# Patient Record
Sex: MALE | Race: BLACK OR AFRICAN AMERICAN | Employment: OTHER | URBAN - METROPOLITAN AREA
[De-identification: names, ages, dates, MRNs, and addresses within clinical notes are randomized per-mention and may not be internally consistent; named-entity substitution may affect disease eponyms.]

---

## 2022-03-01 ENCOUNTER — APPOINTMENT (OUTPATIENT)
Dept: GENERAL RADIOLOGY | Age: 57
End: 2022-03-01
Payer: MEDICAID

## 2022-03-01 ENCOUNTER — APPOINTMENT (OUTPATIENT)
Dept: CT IMAGING | Age: 57
End: 2022-03-01
Payer: MEDICAID

## 2022-03-01 ENCOUNTER — HOSPITAL ENCOUNTER (OUTPATIENT)
Age: 57
Setting detail: OBSERVATION
Discharge: NURSING, REHAB OR LONG-TERM CARE | End: 2022-03-03
Attending: EMERGENCY MEDICINE | Admitting: INTERNAL MEDICINE
Payer: MEDICAID

## 2022-03-01 DIAGNOSIS — G45.9 TIA (TRANSIENT ISCHEMIC ATTACK): Primary | ICD-10-CM

## 2022-03-01 LAB
ANION GAP SERPL CALCULATED.3IONS-SCNC: 11 MMOL/L (ref 3–16)
BASOPHILS ABSOLUTE: 0 K/UL (ref 0–0.2)
BASOPHILS RELATIVE PERCENT: 0.7 %
BUN BLDV-MCNC: 13 MG/DL (ref 7–20)
CALCIUM SERPL-MCNC: 9.6 MG/DL (ref 8.3–10.6)
CHLORIDE BLD-SCNC: 100 MMOL/L (ref 99–110)
CO2: 26 MMOL/L (ref 21–32)
CREAT SERPL-MCNC: 1.2 MG/DL (ref 0.9–1.3)
EKG ATRIAL RATE: 70 BPM
EKG DIAGNOSIS: NORMAL
EKG P AXIS: 38 DEGREES
EKG P-R INTERVAL: 148 MS
EKG Q-T INTERVAL: 346 MS
EKG QRS DURATION: 82 MS
EKG QTC CALCULATION (BAZETT): 373 MS
EKG R AXIS: 49 DEGREES
EKG T AXIS: 52 DEGREES
EKG VENTRICULAR RATE: 70 BPM
EOSINOPHILS ABSOLUTE: 0.1 K/UL (ref 0–0.6)
EOSINOPHILS RELATIVE PERCENT: 1.5 %
GFR AFRICAN AMERICAN: >60
GFR NON-AFRICAN AMERICAN: >60
GLUCOSE BLD-MCNC: 109 MG/DL (ref 70–99)
GLUCOSE BLD-MCNC: 168 MG/DL (ref 70–99)
GLUCOSE BLD-MCNC: 204 MG/DL (ref 70–99)
HCT VFR BLD CALC: 43.2 % (ref 40.5–52.5)
HEMOGLOBIN: 14.5 G/DL (ref 13.5–17.5)
LYMPHOCYTES ABSOLUTE: 2.2 K/UL (ref 1–5.1)
LYMPHOCYTES RELATIVE PERCENT: 34.5 %
MAGNESIUM: 2 MG/DL (ref 1.8–2.4)
MCH RBC QN AUTO: 29 PG (ref 26–34)
MCHC RBC AUTO-ENTMCNC: 33.6 G/DL (ref 31–36)
MCV RBC AUTO: 86.2 FL (ref 80–100)
MONOCYTES ABSOLUTE: 0.5 K/UL (ref 0–1.3)
MONOCYTES RELATIVE PERCENT: 7.1 %
NEUTROPHILS ABSOLUTE: 3.6 K/UL (ref 1.7–7.7)
NEUTROPHILS RELATIVE PERCENT: 56.2 %
PDW BLD-RTO: 17.3 % (ref 12.4–15.4)
PERFORMED ON: ABNORMAL
PERFORMED ON: ABNORMAL
PHOSPHORUS: 2.7 MG/DL (ref 2.5–4.9)
PLATELET # BLD: 143 K/UL (ref 135–450)
PMV BLD AUTO: 8.5 FL (ref 5–10.5)
POTASSIUM SERPL-SCNC: 4.5 MMOL/L (ref 3.5–5.1)
RBC # BLD: 5.01 M/UL (ref 4.2–5.9)
SODIUM BLD-SCNC: 137 MMOL/L (ref 136–145)
TROPONIN: <0.01 NG/ML
WBC # BLD: 6.5 K/UL (ref 4–11)

## 2022-03-01 PROCEDURE — 84100 ASSAY OF PHOSPHORUS: CPT

## 2022-03-01 PROCEDURE — 84484 ASSAY OF TROPONIN QUANT: CPT

## 2022-03-01 PROCEDURE — G0378 HOSPITAL OBSERVATION PER HR: HCPCS

## 2022-03-01 PROCEDURE — 6360000004 HC RX CONTRAST MEDICATION: Performed by: EMERGENCY MEDICINE

## 2022-03-01 PROCEDURE — 6370000000 HC RX 637 (ALT 250 FOR IP): Performed by: INTERNAL MEDICINE

## 2022-03-01 PROCEDURE — 80048 BASIC METABOLIC PNL TOTAL CA: CPT

## 2022-03-01 PROCEDURE — 85025 COMPLETE CBC W/AUTO DIFF WBC: CPT

## 2022-03-01 PROCEDURE — 70496 CT ANGIOGRAPHY HEAD: CPT

## 2022-03-01 PROCEDURE — 99284 EMERGENCY DEPT VISIT MOD MDM: CPT

## 2022-03-01 PROCEDURE — 83735 ASSAY OF MAGNESIUM: CPT

## 2022-03-01 PROCEDURE — 71045 X-RAY EXAM CHEST 1 VIEW: CPT

## 2022-03-01 PROCEDURE — 93005 ELECTROCARDIOGRAM TRACING: CPT | Performed by: EMERGENCY MEDICINE

## 2022-03-01 PROCEDURE — 70450 CT HEAD/BRAIN W/O DYE: CPT

## 2022-03-01 RX ORDER — NIFEDIPINE 60 MG/1
60 TABLET, EXTENDED RELEASE ORAL DAILY
COMMUNITY

## 2022-03-01 RX ORDER — ASPIRIN 81 MG/1
324 TABLET, CHEWABLE ORAL ONCE
Status: COMPLETED | OUTPATIENT
Start: 2022-03-01 | End: 2022-03-01

## 2022-03-01 RX ORDER — DEXTROSE MONOHYDRATE 100 MG/ML
125 INJECTION, SOLUTION INTRAVENOUS PRN
Status: DISCONTINUED | OUTPATIENT
Start: 2022-03-01 | End: 2022-03-03 | Stop reason: HOSPADM

## 2022-03-01 RX ORDER — ATORVASTATIN CALCIUM 40 MG/1
40 TABLET, FILM COATED ORAL NIGHTLY
Status: DISCONTINUED | OUTPATIENT
Start: 2022-03-01 | End: 2022-03-03 | Stop reason: HOSPADM

## 2022-03-01 RX ORDER — ONDANSETRON 2 MG/ML
4 INJECTION INTRAMUSCULAR; INTRAVENOUS EVERY 6 HOURS PRN
Status: DISCONTINUED | OUTPATIENT
Start: 2022-03-01 | End: 2022-03-03 | Stop reason: HOSPADM

## 2022-03-01 RX ORDER — TRAZODONE HYDROCHLORIDE 50 MG/1
25 TABLET ORAL NIGHTLY
COMMUNITY

## 2022-03-01 RX ORDER — ASPIRIN 81 MG/1
81 TABLET ORAL DAILY
Status: DISCONTINUED | OUTPATIENT
Start: 2022-03-02 | End: 2022-03-03 | Stop reason: HOSPADM

## 2022-03-01 RX ORDER — NICOTINE POLACRILEX 4 MG
15 LOZENGE BUCCAL PRN
Status: DISCONTINUED | OUTPATIENT
Start: 2022-03-01 | End: 2022-03-03 | Stop reason: HOSPADM

## 2022-03-01 RX ORDER — ASPIRIN 300 MG/1
300 SUPPOSITORY RECTAL DAILY
Status: DISCONTINUED | OUTPATIENT
Start: 2022-03-02 | End: 2022-03-03 | Stop reason: HOSPADM

## 2022-03-01 RX ORDER — METOPROLOL TARTRATE 50 MG/1
50 TABLET, FILM COATED ORAL 2 TIMES DAILY
COMMUNITY

## 2022-03-01 RX ORDER — POLYETHYLENE GLYCOL 3350 17 G/17G
17 POWDER, FOR SOLUTION ORAL DAILY PRN
Status: DISCONTINUED | OUTPATIENT
Start: 2022-03-01 | End: 2022-03-03 | Stop reason: HOSPADM

## 2022-03-01 RX ORDER — ONDANSETRON 4 MG/1
4 TABLET, ORALLY DISINTEGRATING ORAL EVERY 8 HOURS PRN
Status: DISCONTINUED | OUTPATIENT
Start: 2022-03-01 | End: 2022-03-03 | Stop reason: HOSPADM

## 2022-03-01 RX ORDER — RISPERIDONE 1 MG/1
1.5 TABLET, FILM COATED ORAL 2 TIMES DAILY
COMMUNITY

## 2022-03-01 RX ORDER — INSULIN LISPRO 100 [IU]/ML
0-6 INJECTION, SOLUTION INTRAVENOUS; SUBCUTANEOUS
Status: DISCONTINUED | OUTPATIENT
Start: 2022-03-02 | End: 2022-03-03 | Stop reason: HOSPADM

## 2022-03-01 RX ORDER — INSULIN LISPRO 100 [IU]/ML
0-3 INJECTION, SOLUTION INTRAVENOUS; SUBCUTANEOUS NIGHTLY
Status: DISCONTINUED | OUTPATIENT
Start: 2022-03-01 | End: 2022-03-03 | Stop reason: HOSPADM

## 2022-03-01 RX ORDER — DEXTROSE MONOHYDRATE 50 MG/ML
100 INJECTION, SOLUTION INTRAVENOUS PRN
Status: DISCONTINUED | OUTPATIENT
Start: 2022-03-01 | End: 2022-03-03 | Stop reason: HOSPADM

## 2022-03-01 RX ADMIN — ASPIRIN 324 MG: 81 TABLET, CHEWABLE ORAL at 22:57

## 2022-03-01 RX ADMIN — IOPAMIDOL 80 ML: 755 INJECTION, SOLUTION INTRAVENOUS at 19:53

## 2022-03-02 ENCOUNTER — APPOINTMENT (OUTPATIENT)
Dept: MRI IMAGING | Age: 57
End: 2022-03-02
Payer: MEDICAID

## 2022-03-02 LAB
CHOLESTEROL, TOTAL: 196 MG/DL (ref 0–199)
GLUCOSE BLD-MCNC: 115 MG/DL (ref 70–99)
GLUCOSE BLD-MCNC: 126 MG/DL (ref 70–99)
GLUCOSE BLD-MCNC: 157 MG/DL (ref 70–99)
GLUCOSE BLD-MCNC: 167 MG/DL (ref 70–99)
GLUCOSE BLD-MCNC: 82 MG/DL (ref 70–99)
HDLC SERPL-MCNC: 38 MG/DL (ref 40–60)
LDL CHOLESTEROL CALCULATED: 131 MG/DL
LV EF: 58 %
LVEF MODALITY: NORMAL
PERFORMED ON: ABNORMAL
PERFORMED ON: NORMAL
TRIGL SERPL-MCNC: 135 MG/DL (ref 0–150)
VLDLC SERPL CALC-MCNC: 27 MG/DL

## 2022-03-02 PROCEDURE — 6360000002 HC RX W HCPCS: Performed by: INTERNAL MEDICINE

## 2022-03-02 PROCEDURE — 97161 PT EVAL LOW COMPLEX 20 MIN: CPT

## 2022-03-02 PROCEDURE — 97535 SELF CARE MNGMENT TRAINING: CPT

## 2022-03-02 PROCEDURE — 97116 GAIT TRAINING THERAPY: CPT

## 2022-03-02 PROCEDURE — 83036 HEMOGLOBIN GLYCOSYLATED A1C: CPT

## 2022-03-02 PROCEDURE — G0378 HOSPITAL OBSERVATION PER HR: HCPCS

## 2022-03-02 PROCEDURE — 70551 MRI BRAIN STEM W/O DYE: CPT

## 2022-03-02 PROCEDURE — 97166 OT EVAL MOD COMPLEX 45 MIN: CPT

## 2022-03-02 PROCEDURE — 80061 LIPID PANEL: CPT

## 2022-03-02 PROCEDURE — C8929 TTE W OR WO FOL WCON,DOPPLER: HCPCS

## 2022-03-02 PROCEDURE — 92610 EVALUATE SWALLOWING FUNCTION: CPT

## 2022-03-02 PROCEDURE — 36415 COLL VENOUS BLD VENIPUNCTURE: CPT

## 2022-03-02 PROCEDURE — 96372 THER/PROPH/DIAG INJ SC/IM: CPT

## 2022-03-02 PROCEDURE — 6360000004 HC RX CONTRAST MEDICATION: Performed by: INTERNAL MEDICINE

## 2022-03-02 PROCEDURE — 6370000000 HC RX 637 (ALT 250 FOR IP): Performed by: INTERNAL MEDICINE

## 2022-03-02 PROCEDURE — APPNB60 APP NON BILLABLE TIME 46-60 MINS: Performed by: NURSE PRACTITIONER

## 2022-03-02 RX ORDER — BUPROPION HYDROCHLORIDE 100 MG/1
100 TABLET, EXTENDED RELEASE ORAL 2 TIMES DAILY
COMMUNITY

## 2022-03-02 RX ADMIN — ATORVASTATIN CALCIUM 40 MG: 40 TABLET, FILM COATED ORAL at 20:33

## 2022-03-02 RX ADMIN — ENOXAPARIN SODIUM 40 MG: 100 INJECTION SUBCUTANEOUS at 08:56

## 2022-03-02 RX ADMIN — ATORVASTATIN CALCIUM 40 MG: 40 TABLET, FILM COATED ORAL at 05:23

## 2022-03-02 RX ADMIN — PERFLUTREN 1.65 MG: 6.52 INJECTION, SUSPENSION INTRAVENOUS at 13:38

## 2022-03-02 RX ADMIN — ASPIRIN 81 MG: 81 TABLET, COATED ORAL at 08:56

## 2022-03-02 ASSESSMENT — PAIN SCALES - GENERAL
PAINLEVEL_OUTOF10: 0
PAINLEVEL_OUTOF10: 0

## 2022-03-02 NOTE — ED PROVIDER NOTES
[address]          ATTENDING PHYSICIAN NOTE       Date of evaluation: 3/1/2022    Chief Complaint     Altered Mental Status      History of Present Illness     Stormy Nunez is a 55 y.o. male who presents emergency department with a complaint of a transient episode of difficulty speaking unsteadiness and left-sided weakness. The patient is currently a resident of a local nursing facility and presents to us after he was found to have an approximately 20 to 30-minute episode where he had difficulty speaking which was described as difficulty getting his words out, left-sided weakness, and unsteadiness such that when he was sitting upright he was wavering and consistently looked as if he was going to fall. By the time EMS arrived the patient's symptoms had resolved. He does have recollection that he was having difficulty speaking but otherwise is limited in his ability to communicate history. Review of Systems     As documented in the HPI, otherwise all other systems were reviewed and were negative. Past Medical, Surgical, Family, and Social History     Past medical history -cognitive communication deficit, schizophrenia, unspecified dementia without behavioral disturbance, peripheral vascular disease, primary insomnia, major depressive disorder recurrent, hypertension  He has no past surgical history on file. His family history is not on file. He reports that he has been smoking. He has never used smokeless tobacco. He reports previous alcohol use. He reports previous drug use. Medications     Previous Medications    No medications on file       Allergies     He has No Known Allergies.     Physical Exam     INITIAL VITALS: BP: (!) 117/91, Temp: 98.5 °F (36.9 °C), Pulse: 84, Resp: 20, SpO2: 91 %   General: 52-year-old male appears slightly older than his stated age sitting in bed with no acute cardiorespiratory distress  HEENT:  head is atraumatic, pupils equal round and reactive to light, sclera are clear, oropharynx is nonerythematous  Neck: supple, no lymphadenopathy  Chest: nonlabored respirations, equal chest rise bilaterally, no accessory muscle use  Cardiovascular: Regular, rate, and rhythm, 2+ radial pulses bilaterally, capillary refill 2 seconds  Abdominal: Soft, nontender, nondistended, positive bowel sounds throughout, no rebound or guarding  Skin: Warm, dry well perfused, no rashes  Musculoskeletal: no obvious deformities, no tenderness to palpation diffusely  Neurologic: Alert and appropriately oriented with his environment, he has 5-5 strength in bilateral upper and lower extremities throughout, sensation intact light touch in bilateral upper and lower extremities throughout, no truncal ataxia, normal finger-to-nose testing in bilateral upper extremities, cranial nerves II through XII are intact, visual fields are full to confrontation, NIH stroke scale of 0    Diagnostic Results     EKG   Normal sinus rhythm no ST or T wave changes that be indicative of active ischemia patient has some mild flattening of the T waves in the inferior leads there is normal axis normal intervals    RADIOLOGY:  CTA HEAD NECK W CONTRAST   Final Result      1. No stenosis. PROCEDURE: CT ANGIOGRAPHY HEAD WITH/WITHOUT CONTRAST      INDICATION: tia      COMPARISON: none      TECHNIQUE: Axial CT imaging obtained through the head prior to and following administration of IV contrast. Axial images, multiplanar reformatted images, and maximum intensity projection images were reviewed for CT angiographic technique. IV contrast: 80 mL Isovue 370. FINDINGS:      ANTERIOR CIRCULATION: The intracranial internal carotid arteries, anterior cerebral arteries, and middle cerebral arteries demonstrate no occlusion or stenosis. No evidence for aneurysm or arteriovenous malformation. POSTERIOR CIRCULATION: Mostly fetal origin of bilateral posterior cerebral arteries without stenosis. Small vertebral patent vertebral arteries and basilar artery. INTRACRANIAL VENOUS SYSTEM: No evidence for intracranial venous thrombosis. INTRACRANIAL HEMORRHAGE: None. VENTRICLES: Normal in size and configuration for age. BRAIN PARENCHYMA: Gray-white matter differentiation is normal. No intracranial mass effect. Slight left basal ganglia calcification. SKULL: No destructive osseous process or fracture. PARANASAL SINUSES / MASTOIDS: Mild mucoperiosteal thickening paranasal sinuses. ORBITS: Normal.      EXTRACRANIAL SOFT TISSUES: Normal.      OTHER: None. IMPRESSION:      1. No hemodynamically significant stenosis. CT HEAD WO CONTRAST   Final Result   Impression:   1. No evidence of recent intracranial hemorrhage. XR CHEST PORTABLE   Final Result   Impression:   1. No airspace disease.           LABS:   Results for orders placed or performed during the hospital encounter of 03/01/22   CBC with Auto Differential   Result Value Ref Range    WBC 6.5 4.0 - 11.0 K/uL    RBC 5.01 4.20 - 5.90 M/uL    Hemoglobin 14.5 13.5 - 17.5 g/dL    Hematocrit 43.2 40.5 - 52.5 %    MCV 86.2 80.0 - 100.0 fL    MCH 29.0 26.0 - 34.0 pg    MCHC 33.6 31.0 - 36.0 g/dL    RDW 17.3 (H) 12.4 - 15.4 %    Platelets 819 938 - 866 K/uL    MPV 8.5 5.0 - 10.5 fL    Neutrophils % 56.2 %    Lymphocytes % 34.5 %    Monocytes % 7.1 %    Eosinophils % 1.5 %    Basophils % 0.7 %    Neutrophils Absolute 3.6 1.7 - 7.7 K/uL    Lymphocytes Absolute 2.2 1.0 - 5.1 K/uL    Monocytes Absolute 0.5 0.0 - 1.3 K/uL    Eosinophils Absolute 0.1 0.0 - 0.6 K/uL    Basophils Absolute 0.0 0.0 - 0.2 K/uL   Basic Metabolic Panel   Result Value Ref Range    Sodium 137 136 - 145 mmol/L    Potassium 4.5 3.5 - 5.1 mmol/L    Chloride 100 99 - 110 mmol/L    CO2 26 21 - 32 mmol/L    Anion Gap 11 3 - 16    Glucose 168 (H) 70 - 99 mg/dL    BUN 13 7 - 20 mg/dL    CREATININE 1.2 0.9 - 1.3 mg/dL    GFR Non-African American >60 >60 GFR African American >60 >60    Calcium 9.6 8.3 - 10.6 mg/dL   Phosphorus   Result Value Ref Range    Phosphorus 2.7 2.5 - 4.9 mg/dL   Magnesium   Result Value Ref Range    Magnesium 2.00 1.80 - 2.40 mg/dL   Troponin   Result Value Ref Range    Troponin <0.01 <0.01 ng/mL   POCT Glucose   Result Value Ref Range    POC Glucose 204 (H) 70 - 99 mg/dl    Performed on ACCU-CHEK    EKG 12 Lead   Result Value Ref Range    Ventricular Rate 70 BPM    Atrial Rate 70 BPM    P-R Interval 148 ms    QRS Duration 82 ms    Q-T Interval 346 ms    QTc Calculation (Bazett) 373 ms    P Axis 38 degrees    R Axis 49 degrees    T Axis 52 degrees    Diagnosis       EKG performed in ER and to be interpreted by ER physician. Confirmed by MD, ER ([id number]),  Alireza Yeager ([phone]) on 3/1/2022 7:27:56 PM         RECENT VITALS:  BP: (!) 117/91, Temp: 98.5 °F (36.9 °C), Pulse: 84, Resp: 20, SpO2: 91 %         ED Course     Nursing Notes, Past Medical Hx, Past Surgical Hx, Social Hx, Allergies, and Family Hx were reviewed. The patient was given the following medications:  Orders Placed This Encounter   Medications    iopamidol (ISOVUE-370) 76 % injection 80 mL       CONSULTS:  Rashmi Alcantara / ANGELO / Grabiel Spine is a 55 y.o. male who presents to the emergency department with a complaint of approximately 30-minute episode of difficulty speaking unilateral weakness and truncal ataxia which appears to be consistent with a TIA. The patient has limited records within our electronic medical record system. Review of the patient's history from [employer] there is no documented history of previous stroke or TIA. At the time of the patient's evaluation the patient was neurologically intact.   The patient had laboratory investigations sent which did not show any evidence of any significant abnormalities a CBC basic metabolic profile all were within normal limits troponin was normal a EKG showed no evidence of any ischemic changes chest x-ray with no acute cardiopulmonary disease a CT of the head with no recent acute intracranial hemorrhage and a CTA of the head and neck with no flow-limiting stenosis. At this point time given the patient's history and presentation I feel he does require for further evaluation for this likely TIA as an inpatient. I am speaking with the hospitalist regarding admission. .    Clinical Impression     1. TIA (transient ischemic attack)        Disposition     PATIENT REFERRED TO:  No follow-up provider specified.     DISCHARGE MEDICATIONS:  New Prescriptions    No medications on file       DISPOSITION Decision To Admit 03/01/2022 08:44:31 PM         Jovi Shankar MD  03/01/22 2050

## 2022-03-02 NOTE — CONSULTS
Clinical Pharmacy Progress Note  Medication History     Admit Date: 3/1/2022    Pharmacy consulted to verify home medication list by Dr. Sara Lopez. List of of current medications patient is taking is complete. Home Medication list in EPIC updated to reflect changes noted below. Source of information: medication list from [employer]; list dated 3/2/22    Please call with any questions.   Charito Templeton PharmD, BCPS  Wireless: [id number]   3/2/2022 10:59 AM

## 2022-03-02 NOTE — PLAN OF CARE
Problem: Skin Integrity:  Goal: Will show no infection signs and symptoms  Description: Will show no infection signs and symptoms  3/2/2022 1638 by Cheryl Shen RN  Outcome: Ongoing  3/2/2022 0359 by Sophia Sims RN  Outcome: Ongoing  3/2/2022 0312 by Sophia Sims RN  Outcome: Ongoing   Encouraged frequent turns to maintain skin integrity. Pt turns self in bed    Problem: Falls - Risk of:  Goal: Will remain free from falls  Description: Will remain free from falls  3/2/2022 1638 by Cheryl Shen RN  Outcome: Ongoing  3/2/2022 0359 by Sophia Sims RN  Outcome: Ongoing  3/2/2022 0312 by Sophia Sims RN  Outcome: Ongoing   Bed in lowest position. Alarms in place. Gait belt used for ambulation  Call light in reach.  Nonskid socks on    Problem: HEMODYNAMIC STATUS  Goal: Patient has stable vital signs and fluid balance  Outcome: Ongoing   Encouraged fluid intake

## 2022-03-02 NOTE — CARE COORDINATION
Case Management Assessment            Discharge Note                    Date / Time of Note: 3/2/2022 2:04 PM                  Discharge Note Completed by: Apryl Mccormick RN     Patient is from [facility] and is a long term care resident there. He is able to return today per [facility] in admissions. Transport arranged via [address] for [address] today. Faxed orders to [facility] and ambulance form given to [facility]. Patient is in agreement with this d/c plan. Patient Name: Derrick Magallanes   YOB: 1965  Diagnosis: TIA (transient ischemic attack) [G45.9]   Date / Time: 3/1/2022  6:50 PM    Current PCP: Christine Muniz MD  Clinic patient: No    Hospitalization in the last 30 days: No    Advance Directives:  Code Status: Full Code  [place] DNR form completed and on chart: Not Indicated    Financial:  Payor: Maggy Issa / Plan: Salvador Wilson / Product Type: *No Product type* /      Pharmacy:  No Pharmacies [facility] [email or web address] [facility] [employer] medications?:    Assistance provided by Case Management: None at this time    Does patient want to participate in local refill/ meds to beds program?:      Meds To Beds General Rules:  1. Can ONLY be done Monday- Friday between 8:30am-5pm  2. Prescription(s) must be in pharmacy by 3pm to be filled same day  3. Copy of patient's insurance/ prescription drug card and patient face sheet must be sent along with the prescription(s)  4. Cost of Rx cannot be added to hospital bill. If financial assistance is needed, please contact unit  or ;  or  CANNOT provide pharmacy voucher for patients co-pays  5.  Patients can then  the prescription on their way out of the hospital at discharge, or pharmacy can deliver to the bedside if staff is available. (payment due at time of pick-up or delivery - cash, check, or card accepted)     Able to afford home medications/ co-pay costs: Yes    ADLS:  Current PT AM-PAC Score: 24 /24  Current OT AM-PAC Score: 22 /24      DISCHARGE Disposition: [place] [facility]): [facility]  Phone: [phone] ext [phone]  Fax: [phone]    LOC at discharge: [address] Completed: Yes    Notification completed in HENS/PAS?:  Not Applicable    IMM Completed:   Not Indicated    Transportation:  Transportation PLAN for discharge: EMS transportation   Mode of Transport: Ambulance stretcher - BLS  Reason for medical transport: Confused due to dementia and requires ambulance transport due to impulsive  Name of [address] [place] [address], [address]: [facility]  Phone: [phone]  Time of Transport: 1600    Transport form completed: Yes      The Plan for Transition of Care is related to the following treatment goals of TIA (transient ischemic attack) [G45.9]    The Patient and/or patient representative Teto Caraballo and his family were provided with a choice of provider and agrees with the discharge plan Yes    Freedom of choice list was provided with basic dialogue that supports the patient's individualized plan of care/goals and shares the quality data associated with the providers.  Yes    Care Transitions patient: No    Yung Mccallum RN  [facility]  Case Management Department  Ph: [phone]  Fax: [phone]

## 2022-03-02 NOTE — PROGRESS NOTES
Speech Language Pathology  Facility/Department: [facility] 5T ORTHO/NEURO   CLINICAL BEDSIDE SWALLOW EVALUATION/Speech screen - DC    NAME: Angela Rasmussen  : 1965  MRN: [id number]    ADMISSION DATE: 3/1/2022  ADMITTING DIAGNOSIS: has TIA (transient ischemic attack) on their problem list.  ONSET DATE: 3/1/22    Recent Chest Xray 3/1/22     1. No airspace disease. CT of head 3/1/22     1. No evidence of recent intracranial hemorrhage       Date of Eval: 3/2/2022  Evaluating Therapist: Yobani Simons SLP    Current Diet level:  Current Diet : Regular  Current Liquid Diet : Thin    Primary Complaint  Patient Complaint: denies any swallowing or speech problems    Pain:  Pain Assessment  Pain Assessment: denies    Reason for Referral  Angela Rasmussen was referred for a bedside swallow evaluation to assess the efficiency of his swallow function, identify signs and symptoms of aspiration and make recommendations regarding safe dietary consistencies, effective compensatory strategies, and safe eating environment. History Of Present Illness:    Per MD notes:  \"55 y.o. male who presents from his nursing home via EMS. According to nursing home report patient was last known normal was at 5:20 PM today. His primary nurse has noticed that patient was slumping to his left side, left-sided weakness, aphasic with slurred speech and ataxic movements leading to unsteady gait. The symptoms has lasted for about 20-30 minutes. By the time EMS arrived his symptoms has resolved. The patient could tell me that he was having trouble speaking, left-sided weakness. When asked how long he had the symptoms, patient said since yesterday. Patient has been in this current nursing home for the past 8 years most likely secondary to his schizophrenia and dementia with cognitive impairment. \"    Impression  Per chart review, symptoms resolved prior to coming to hospital. Pt denies any problems at this time, denies pain.   Pt alert, was oriented to place and time, followed simple directions. 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 Pt with history of schizophrenia and dementia per chart review. No word finding/aphasia or dysarthria noted. Oral structures grossly intact, no asymmetry noted. Pt analyzed with breakfast consisting of pancakes, eggs, martinez, fruit and liquids. Pt demonstrated adequate labial seal with no anterior loss of bolus. Pt demonstrated prolonged but adequate mastication, no oral residue noted. No overt signs of aspiration emerged, voice remained clear. CXR and notes state lungs clear. Recommend cont regular diet/thin liquids  Dysphagia Diagnosis: Swallow function appears grossly intact  Dysphagia Outcome Severity Scale: Level 7: Normal in all situations     Treatment Plan  Requires SLP Intervention: no  Duration/Frequency of Treatment: no follow up indicated  D/C Recommendations: To be determined     Recommended Diet and Intervention  Diet Solids Recommendation: Regular  Liquid Consistency Recommendation: Thin  Recommended Form of Meds: Whole with water  Therapeutic Interventions: Oral care; Patient/Family education    Compensatory Swallowing Strategies  Upright as possible for all oral intake    Treatment/Goals  n/a    General  Chart Reviewed: Yes  Behavior/Cognition: Alert; Cooperative  Respiratory Status: Room air  Communication Observation: Functional  Follows Directions: Simple  Dentition: Some missing teeth  Patient Positioning: Upright in bed  Baseline Vocal Quality: Normal  Volitional Cough: Strong  Prior Dysphagia History: none  Consistencies Administered: Reg solid; Dysphagia Soft and Bite-Sized (Dysphagia III); Thin - cup; Thin - straw    Vision/Hearing  Vision  Vision: Within Functional Limits  Hearing  Hearing: Within functional limits    Oral Motor Deficits  Oral/Motor  Oral Motor:  Within functional limits    Prognosis  Prognosis  Prognosis for safe diet advancement: good  Barriers to reach goals: age  Individuals consulted  Consulted and agree with results and recommendations: Patient;RN    Education  Patient Education: Pt educated to purpose of visit  Patient Education Response: Verbalizes understanding  Safety Devices in place: Yes  Type of devices: Call light within reach       Therapy Time  SLP Individual Minutes  Time In: 0745   Time out: 804    Plan:  Recommended diet: cont regular diet/thin liquids - if any s/s of aspiration emerge, or there is respiratory decline or changes in speech, please re-refer to SLP  Dc recommendation: no follow up indicated at this time  Pt therapy goal: n/a  Pt dc goal: go home  Tracey Fontenot M.S./[facility]-SLP #[id number]  Pg.  # [id number]  Needs met prior to leaving room, call light within reach, d/w REAGAN Fried   3/2/2022 8:00 AM

## 2022-03-02 NOTE — ED TRIAGE NOTES
Was seen normal at 1720. At 1750 this evening, primary RN was called to his room and reports patient was slumping to left side, unable to raise left arm as high as right arm. Aphasic, in that when asked his birth date he repeatedly tried to spell his name,and speech was slurred. Upon arrival to ER patient's NIH is normal and EMS states symptoms resolved prior to their arrival BS by bradford is 204.   Dr Tamia Casillas in to see patient

## 2022-03-02 NOTE — PLAN OF CARE
Problem: Skin Integrity:  Goal: Will show no infection signs and symptoms  Description: Will show no infection signs and symptoms  3/2/2022 0359 by Edu Muñoz RN  Outcome: Ongoing  3/2/2022 0312 by Edu Muñoz RN  Outcome: Ongoing  Goal: Absence of new skin breakdown  Description: Absence of new skin breakdown  3/2/2022 0359 by Edu Muñoz RN  Outcome: Ongoing  3/2/2022 0312 by Edu Muñoz RN  Outcome: Ongoing     Problem: Falls - Risk of:  Goal: Will remain free from falls  Description: Will remain free from falls  3/2/2022 0359 by Edu Muñoz RN  Outcome: Ongoing  3/2/2022 0312 by Edu Muñoz RN  Outcome: Ongoing  Goal: Absence of physical injury  Description: Absence of physical injury  3/2/2022 0359 by Edu Muñoz RN  Outcome: Ongoing  3/2/2022 0312 by Edu Muñoz RN  Outcome: Ongoing

## 2022-03-02 NOTE — DISCHARGE INSTR - COC
Continuity of Care Form    Patient Name: Truman Reina   :  1965  MRN:  [id number]    Admit date:  3/1/2022  Discharge date:  2022    Code Status Order: Full Code   Advance Directives:      Admitting Physician:  Mandi Sands MD  PCP: Linda Jones MD    Discharging Nurse: [facility] Unit/Room#: 3064/7977-41  Discharging Unit Phone Number: [phone]    Emergency Contact:   No emergency contact information on file. Past Surgical History:  History reviewed. No pertinent surgical history. Immunization History: There is no immunization history on file for this patient. Active Problems:  Patient Active Problem List   Diagnosis Code    TIA (transient ischemic attack) G45.9       Isolation/Infection:   Isolation            No Isolation          Patient Infection Status       None to display            Nurse Assessment:  Last Vital Signs: /74   Pulse 64   Temp 97.9 °F (36.6 °C) (Oral)   Resp 14   Ht 5' 8\" (1.727 m)   Wt 245 lb (111.1 kg)   SpO2 96%   BMI 37.25 kg/m²     Last documented pain score (0-10 scale): Pain Level: 0  Last Weight:   Wt Readings from Last 1 Encounters:   22 245 lb (111.1 kg)     Mental Status:  oriented and alert    IV Access:  - None    Nursing Mobility/ADLs:  Walking   Assisted  Transfer  Assisted  Bathing  Assisted  Dressing  Assisted  Toileting  Assisted  Feeding  Independent  Med Admin  Assisted  Med Delivery   whole    Wound Care Documentation and Therapy:        Elimination:  Continence: Bowel: Yes  Bladder: Yes  Urinary Catheter: None   Colostomy/Ileostomy/Ileal Conduit: No       Date of Last BM: NA    Intake/Output Summary (Last 24 hours) at 3/2/2022 1155  Last data filed at 3/2/2022 0837  Gross per 24 hour   Intake 360 ml   Output --   Net 360 ml     I/O last 3 completed shifts:   In: 240 [P.O.:240]  Out: -     Safety Concerns:     None    Impairments/Disabilities:      None    Nutrition Therapy:  Current Nutrition Therapy:   - Oral Diet:  General    Routes of Feeding: Oral  Liquids: Thin Liquids  Daily Fluid Restriction: no  Last Modified Barium Swallow with Video (Video Swallowing Test): not done    Treatments at the Time of Hospital Discharge:   Respiratory Treatments: RA  Oxygen Therapy:  is not on home oxygen therapy. Ventilator:    - No ventilator support    Rehab Therapies: NA  Weight Bearing Status/Restrictions: No weight bearing restirctions  Other Medical Equipment (for information only, NOT a DME order):  walker  Other Treatments: NA    Patient's personal belongings (please select all that are sent with patient):  None    RN SIGNATURE:  Electronically signed by Tammy Em RN on 3/3/22 at 10:28 AM EST    CASE MANAGEMENT/SOCIAL WORK SECTION    Inpatient Status Date:03/01/2022    Readmission Risk Assessment Score:  Readmission Risk              Risk of Unplanned Readmission:  0           Discharging to Facility/ Agency   Name: Daniel Teran  Address:  Phone:report- [phone] ext [phone] \[facility] [place] [facility]\""    Dialysis Facility (if applicable)   Name:  Address:  Dialysis Schedule:  Phone:  Fax:    / signature: Electronically signed by Jovana Villalba RN on 3/2/22 at 1:36 PM EST    PHYSICIAN SECTION    Prognosis: Fair    Condition at Discharge: Stable    Rehab Potential (if transferring to Rehab): Fair    Recommended Labs or Other Treatments After Discharge:     Physician Certification: I certify the above information and transfer of Ildefonso Marx  is necessary for the continuing treatment of the diagnosis listed and that he requires Intermediate Nursing Care for greater 30 days.      Update Admission H&P: No change in H&P    PHYSICIAN SIGNATURE:  Electronically signed by Domenick Lesch, MD on 3/2/22 at 11:55 AM EST

## 2022-03-02 NOTE — CONSULTS
claudication ([facility]     Schizophrenia ([facility]     Substance abuse ([facility]      SURGICAL HISTORY:  History reviewed. No pertinent surgical history. FAMILY HISTORY & SOCIAL HISTORY:  Family history non-contributory  History reviewed. No pertinent family history.   Social History     Tobacco History     Smoking Status  Current Every Day Smoker    Smokeless Tobacco Use  Never Used          Alcohol History     Alcohol Use Status  Not Currently          Drug Use     Drug Use Status  Not Currently          Sexual Activity     Sexually Active  Not Asked                Allergies & Outpatient Medications   ALLERGIES:  No Known Allergies  HOME MEDICATIONS:  Current Discharge Medication List      CONTINUE these medications which have NOT CHANGED    Details   buPROPion (WELLBUTRIN SR) 100 MG extended release tablet Take 100 mg by mouth 2 times daily      metoprolol tartrate (LOPRESSOR) 50 MG tablet Take 50 mg by mouth 2 times daily      NIFEdipine (PROCARDIA XL) 60 MG extended release tablet Take 60 mg by mouth daily      risperiDONE (RISPERDAL) 1 MG tablet Take 1.5 mg by mouth 2 times daily       traZODone (DESYREL) 50 MG tablet Take 25 mg by mouth nightly         STOP taking these medications       NONFORMULARY Comments:   Reason for Stopping:                 Physical Exam   PHYSICAL EXAM:  Vitals:    03/01/22 2045 03/01/22 2208 03/02/22 0345 03/02/22 0630   BP: 130/74 127/80 111/76 114/74   Pulse: 77 74 63 64   Resp: 16 14 16 14   Temp:  97.8 °F (36.6 °C) 97.9 °F (36.6 °C) 97.9 °F (36.6 °C)   TempSrc:  Oral Oral Oral   SpO2: 94% 95% 95% 96%   Weight:       Height:  5' 8\" (1.727 m)           General: Alert, no distress, well-nourished  Neurologic  Mental status: alert  orientation to person, place, time, situation   Attention intact as able to attend well to the exam     Language fluent in conversation   Comprehension intact; follows simple commands    Cranial nerves:   CN2: Visual fields full w/o extinction on confrontational testing   CN 3,4,6: Pupils equal and reactive to light, extraocular muscles intact  CN5: Facial sensation symmetric   CN7: Face symmetric without dysarthria  CN8: Hearing symmetric to spoken voice  CN9: Palate elevated symmetrically  CN11: Traps full strength on shoulder shrug  CN12: Tongue midline with protrusion    Motor Exam:   R  L    Deltoid 5  5   Biceps 5 5   Triceps 5 5   Wrist extension  5 5   Interossei 5 5      R  L    Hip flexion  5  5   Hip extension  5 5   Knee flexion  5 5   Knee extension  5 5   Ankle dorsiflexion  5 5   Ankle plantar flexion  5 5     Deep tendon reflexes:    R  L    Biceps  2  2   Triceps  2 2   Brachioradialis  2 2   Patellar  2 2   Achilles  2 2     Sensory: light touch intact and symmetric in all 4 extremities. No sensory extinction on bilateral simultaneous stimulation  Cerebellar/coordination: finger nose finger normal without ataxia  Tone: normal in all 4 extremities  Gait: deferred for safety     OTHER SYSTEMS:  Cardiovascular: Warm, appears well perfused   Respiratory: Easy, non-labored respiratory pattern   Abdominal: Abdomen is without distention   Extremities: Upper and lower extremities are atraumatic in appearance without deformity. No swelling or erythema. Diagnostic Testing Results   IMAGES:  Images personally reviewed and agree w/ radiology interpretation. Head CT w/o Contrast: no ICH    CTA of Head / Neck w/ Contrast: No hemodynamically significant stenosis     LABS:  All results below personally reviewed. Pertinent positives & negatives are addressed in Impression & Recommendations below.      LABS   Metabolic Panel Recent Labs     03/01/22 1914      K 4.5      CO2 26   BUN 13   CREATININE 1.2   GLUCOSE 168*   CALCIUM 9.6   PHOS 2.7   MG 2.00      CBC / Coags Recent Labs     03/01/22 1914   WBC 6.5   RBC 5.01   HGB 14.5   HCT 43.2           CURRENT SCHEDULED MEDICATIONS   Inpatient Medications   enoxaparin, 40 mg, SubCUTAneous, Daily    aspirin, 81 mg, Oral, Daily **OR** aspirin, 300 mg, Rectal, Daily    atorvastatin, 40 mg, Oral, Nightly    insulin lispro, 0-6 Units, SubCUTAneous, TID WC    insulin lispro, 0-3 Units, SubCUTAneous, Nightly   Infusions    dextrose      dextrose          IMPRESSION & RECOMMENDATIONS     IMPRESSION:  Mr. Saul Bailey is a 55year old with dementia and schizophrenia who presents with sudden and transient focal neurologic deficit (though there is discrepancy between what he tells me and what his nurse told other care providers) most consistent with TIA. Vessel imaging unrevealing. RECOMMENDATIONS:  -Echocardiogram with bubble - if not previously completed   -Check lipids and a1c  -Continue aspirin, statin   -DVT chemoprophylaxis  -Telemetry for afib - notify neurology of any afib  -PT/OT: eval and treat  -ST: eval and treat and dysphagia screen  -Nursing bedside swallow prior to any PO intake   -Blood Pressure goal less than 140/90 mmHG  -30 day event monitor at discharge  -Stroke Education at Discharge  -Follow up w/ Neurology in 3 months     RICHARD Go - CNP   Neurology & Neurocritical Care   Neurology Line: [phone]  [employer]: [facility] Neuro Critical [facility] NPs  3/2/2022 11:26 AM    I spent 60 minutes in the care of this patient. Over 50% of that time was in face-to-face counseling regarding disease process, diagnostic testing, preventative measures, and answering patient and family questions.

## 2022-03-02 NOTE — PROGRESS NOTES
Occupational Therapy   Occupational Therapy Initial Assessment/Treatment  Date: 3/2/2022   Patient Name: Zafar Hutchinson  MRN: [id number]     : 1965    Date of Service: 3/2/2022    Discharge Recommendations:  Zafar Hutchinson scored a 22/24 on the -[facility] ADL Inpatient form. At this time, no further OT is recommended upon discharge due to performing at functional baseline. Recommend patient returns to prior setting with prior services. OT Equipment Recommendations  Equipment Needed: No    Assessment   Performance deficits / Impairments: Decreased functional mobility ; Decreased ADL status; Decreased endurance;Decreased cognition    Assessment: Pt is a 54 y/o M who presents at his functional baseline. Pt is from a LTC facility and recieves 24hr A prn. Pt able to complete fx mobility w/o AD and transfers, including toilet transfer, w/ spvn this date. Pt also completed bed mobility independently. Pt plans to return to LTC facility who are able to provide assist upon dc. Pt denies any concerns with functioning and reports feeling at his baseline. No further OT needs identified. Will sign off    Treatment Diagnosis: impaired ADLs and functional  mobility  Prognosis: Good  Decision Making: Medium Complexity  OT Education: OT Role;Plan of Care;Transfer Training  Patient Education: verb understanding  REQUIRES OT FOLLOW UP: No  Activity Tolerance  Activity Tolerance: Patient Tolerated treatment well;Patient limited by fatigue  Activity Tolerance: Pt tolerated treatment well, but is limited by SOB w/ increased activity and required 2 seated rest breaks t/o session  Safety Devices  Safety Devices in place: Yes  Type of devices: Left in chair;Nurse notified;Call light within reach; Chair alarm in place           Patient Diagnosis(es): The encounter diagnosis was TIA (transient ischemic attack).      has a past medical history of Dementia ([facility] Depression, Hypertension, PVD (peripheral vascular disease) with Functional Limits  Hearing: Within functional limits      Orientation  Overall Orientation Status: Within Normal Limits        Balance  Sitting Balance: Modified independent   Standing Balance: Supervision  Standing Balance  Time: ~15min total  Activity: functional mobility in hallway; funcitonal mobility to/from bathroom; stance for ADLs; stance for transfers  Functional Mobility  Functional - Mobility Device: No device  Activity: To/from bathroom; Other (~350 ft in hallway)  Assist Level: Supervision  Toilet Transfers  Toilet - Technique: Ambulating  Equipment Used: Standard toilet  Toilet Transfer: Supervision     ADL  Grooming: Supervision; Increased time to complete (pt completed oral hygiene at sink and required VC to terminate task)  LE Dressing: Supervision (pt simulated donning/doffing socks and was able to complete pant mgmt at hips in stance w/ spvn)  Toileting: Supervision (Pt simulated w/ spvn)  Tone RUE  RUE Tone: Normotonic  Tone LUE  LUE Tone: Normotonic  Coordination  Movements Are Fluid And Coordinated: Yes     Bed mobility  Supine to Sit: Independent (HOB elevated)  Scooting: Independent (to EOB)  Transfers  Sit to stand: Supervision  Stand to sit: Supervision     Cognition  Overall Cognitive Status: Exceptions  Arousal/Alertness: Delayed responses to stimuli  Following Commands: Follows one step commands with increased time; Follows one step commands with repetition  Attention Span: Difficulty attending to directions; Difficulty dividing attention; Attends with cues to redirect  Insights: Decreased awareness of deficits  Initiation: Requires cues for some  Sequencing: Requires cues for some  Cognition Comment: pt noted to have dementia and stating that 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[employer] [place] though was oriented and otherwise appropriate        Sensation  Overall Sensation Status: WFL (per pt report)        LUE AROM (degrees)  LUE AROM : WFL  Left Hand AROM (degrees)  Left Hand AROM: WFL  RUE AROM (degrees)  RUE AROM : WFL  Right Hand AROM (degrees)  Right Hand AROM: WFL  LUE Strength  Gross LUE Strength: WFL  RUE Strength  Gross RUE Strength: WFL                   Plan        G-Code     OutComes Score                                                  AM-PAC Score        AM-PAC Inpatient Daily Activity Raw Score: 22 (03/02/22 1056)  AM-PAC Inpatient ADL T-Scale Score : 47.1 (03/02/22 1056)  ADL Inpatient CMS 0-100% Score: 25.8 (03/02/22 1056)  ADL Inpatient CMS G-Code Modifier : CJ (03/02/22 1056)    Goals  Short term goals  Short term goal 3: .       Therapy Time   Individual Concurrent Group Co-treatment   Time In 0950         Time Out 1017         Minutes 27                 Timed Code Treatment Minutes:  12    Total Treatment Minutes:  [address], OT

## 2022-03-02 NOTE — H&P
Hospital Medicine History & Physical      PCP: Indu Sauer MD    Date of Admission: 3/1/2022    Date of Service: Pt seen/examined on 3/1/2022. Placed in Observation. Chief Complaint: Strokelike symptoms and altered mental status at nursing home      History Of Present Illness:      55 y.o. male who presents from his nursing home via EMS. According to nursing home report patient was last known normal was at 5:20 PM today. His primary nurse has noticed that patient was slumping to his left side, left-sided weakness, aphasic with slurred speech and ataxic movements leading to unsteady gait. The symptoms has lasted for about 20-30 minutes. By the time EMS arrived his symptoms has resolved. The patient could tell me that he was having trouble speaking, left-sided weakness. When asked how long he had the symptoms, patient said since yesterday. Patient has been in this current nursing home for the past 8 years most likely secondary to his schizophrenia and dementia with cognitive impairment. Past Medical History:          Diagnosis Date    D[facility] ([facility]     Depression     Hypertension     PVD (peripheral vascular disease) with claudication ([facility]     Schizophrenia ([facility]     [facility] ([facility]        Past Surgical History:      History reviewed. No pertinent surgical history. Medications Prior to Admission:      Prior to Admission medications    Not on File   Unable to obtain nursing home medication list.  Pharmacy consult placed    Allergies:  Patient has no known allergies. Social History:      TOBACCO:   reports that he has been smoking. He has never used smokeless tobacco.  ETOH:   reports previous alcohol use. E-Cigarettes/Vaping Use     Questions Responses    E-Cigarette/Vaping Use     Start Date     Passive Exposure     Quit Date     Counseling Given     Comments         Family History:    Reviewed in detail and negative for DM, CAD, Cancer, CVA.      REVIEW OF SYSTEMS COMPLETED:     Pertinent negatives and positives are as documented in HPI. All other systems were reviewed and is negative. PHYSICAL EXAM PERFORMED:    BP (!) 117/91   Pulse 84   Temp 98.5 °F (36.9 °C) (Oral)   Resp 20   Ht 5' 8\" (1.727 m)   Wt 245 lb (111.1 kg)   SpO2 91%   BMI 37.25 kg/m²     General appearance:  No apparent distress, appears older than stated age and cooperative. HEENT:  Normal cephalic, atraumatic without obvious deformity. Pupils equal, round, and reactive to light. Extra ocular muscles intact. Conjunctivae/corneas clear. Neck: Supple, with full range of motion. No jugular venous distention. Trachea midline. Respiratory:  Normal respiratory effort. Clear to auscultation, bilaterally without Rales/Wheezes/Rhonchi. Cardiovascular:  Regular rate and rhythm with normal S1/S2 without murmurs, rubs or gallops. Abdomen: Soft, non-tender, non-distended with normal bowel sounds. Musculoskeletal:  No clubbing, cyanosis or edema bilaterally. Full range of motion without deformity. Skin: Skin color, texture, turgor normal.  No rashes or lesions. Neurologic:  Neurovascularly intact without any focal sensory/motor deficits. Cranial nerves: II-XII intact, grossly non-focal.  NIH stroke scale = 0. Speech clear. Able to lift both legs above the bed level and hold up in the air against resistance, motor in both upper extremities 5/5  Psychiatric:  Alert and oriented to self and environment  Capillary Refill: Brisk,3 seconds, normal  Peripheral Pulses: +2 palpable, equal bilaterally       Labs:     Recent Labs     03/01/22 1914   WBC 6.5   HGB 14.5   HCT 43.2        Recent Labs     03/01/22 1914      K 4.5      CO2 26   BUN 13   CREATININE 1.2   CALCIUM 9.6   PHOS 2.7     No results for input(s): AST, ALT, BILIDIR, BILITOT, ALKPHOS in the last 72 hours. No results for input(s): INR in the last 72 hours.   Recent Labs     03/01/22 1914   TROPONINI <0.01 Urinalysis:    No results found for: Lennice Carlito, BACTERIA, RBCUA, BLOODU, [address], [address]    Radiology:     EKG:  I have reviewed the EKG with the following interpretation: Normal sinus rhythm, normal rate and intervals, nonspecific ST-T changes    CTA HEAD NECK W CONTRAST   Final Result      1. No stenosis. PROCEDURE: CT ANGIOGRAPHY HEAD WITH/WITHOUT CONTRAST      INDICATION: tia      COMPARISON: none      TECHNIQUE: Axial CT imaging obtained through the head prior to and following administration of IV contrast. Axial images, multiplanar reformatted images, and maximum intensity projection images were reviewed for CT angiographic technique. IV contrast: 80 mL Isovue 370. FINDINGS:      ANTERIOR CIRCULATION: The intracranial internal carotid arteries, anterior cerebral arteries, and middle cerebral arteries demonstrate no occlusion or stenosis. No evidence for aneurysm or arteriovenous malformation. POSTERIOR CIRCULATION: Mostly fetal origin of bilateral posterior cerebral arteries without stenosis. Small vertebral patent vertebral arteries and basilar artery. INTRACRANIAL VENOUS SYSTEM: No evidence for intracranial venous thrombosis. INTRACRANIAL HEMORRHAGE: None. VENTRICLES: Normal in size and configuration for age. BRAIN PARENCHYMA: Gray-white matter differentiation is normal. No intracranial mass effect. Slight left basal ganglia calcification. SKULL: No destructive osseous process or fracture. PARANASAL SINUSES / MASTOIDS: Mild mucoperiosteal thickening paranasal sinuses. ORBITS: Normal.      EXTRACRANIAL SOFT TISSUES: Normal.      OTHER: None. IMPRESSION:      1. No hemodynamically significant stenosis. CT HEAD WO CONTRAST   Final Result   Impression:   1. No evidence of recent intracranial hemorrhage. XR CHEST PORTABLE   Final Result   Impression:   1. No airspace disease.           ASSESSMENT:    Active Hospital Problems Diagnosis Date Noted    TIA (transient ischemic attack) [G45.9] 03/01/2022   #Essential hypertension  #Schizophrenia  #Cognitive deficits with developmental delay  #Hyperglycemia, unknown whether patient is a known diabetic  #Thrombocytopenia-acute versus chronic  #Obesity class II with BMI of 37    PLAN:  -Admit to observation status  -Bedside swallow evaluation and chewable aspirin 324 mg x 1  -Continue on aspirin 81 mg daily and statins as ordered  -Neurology consult, further imaging studies per neurology recommendations  -2D echocardiogram  -Check A1c and lipid panel  -Pharmacy consult for verification of nursing home medications  -Continue on current insulin regimen/low-dose SSI  -Monitor electrolytes, renal function and CBC  -PT/OT/SLP evaluation  -Social service consult    DVT Prophylaxis: Lovenox  Diet: ADULT DIET; Regular  Code Status: Full Code    PT/OT Eval Status: As tolerated with assistance and fall precautions    Dispo -GMF with telemetry       William Alaniz MD    Thank you Darci Camp MD for the opportunity to be involved in this patient's care. If you have any questions or concerns please feel free to contact me at [phone].

## 2022-03-02 NOTE — DISCHARGE SUMMARY
Hospital Medicine Discharge Summary    Patient ID: Phillip Lloyd      Patient's PCP: Charu Dhillon MD    Admit Date: 3/1/2022     Discharge Date:   3/2/2022    Admitting Provider: Cortney Delcid MD     Discharge Provider: Lore Lepe MD     Discharge Diagnoses: Active Hospital Problems    Diagnosis     TIA (transient ischemic attack) [G45.9]        The patient was seen and examined on day of discharge and this discharge summary is in conjunction with any daily progress note from day of discharge. Hospital Course:     Acute metabolic encephalopathy:  Admitted for possible TIA. CT head negative. Neuro consulted and recommended mri and echo. MRI negative and echo showed nml ef w/ neg bubble study. Pt is at baseline ms and is being discharged back to his facility,    Chronic med problems- stable. #Essential hypertension  #Schizophrenia  #Cognitive deficits with developmental delay  #Hyperglycemia, unknown whether patient is a known diabetic  #Thrombocytopenia-acute versus chronic  #Obesity class II with BMI of 37    Physical Exam Performed:     /74   Pulse 64   Temp 97.9 °F (36.6 °C) (Oral)   Resp 14   Ht 5' 8\" (1.727 m)   Wt 245 lb (111.1 kg)   SpO2 96%   BMI 37.25 kg/m²       General appearance:  No apparent distress  Neck: Supple, with full range of motion. Respiratory:  Normal respiratory effort. Clear to auscultation, bilaterally without Rales/Wheezes/Rhonchi. Cardiovascular:  Regular rate and rhythm   Abdomen: Soft, non-tender, non-distended   Neurologic: grossly non-focal.    Labs:  For convenience and continuity at follow-up the following most recent labs are provided:      CBC:    Lab Results   Component Value Date    WBC 6.5 03/01/2022    HGB 14.5 03/01/2022    HCT 43.2 03/01/2022     03/01/2022       Renal:    Lab Results   Component Value Date     03/01/2022    K 4.5 03/01/2022     03/01/2022    CO2 26 03/01/2022    BUN 13 03/01/2022 CREATININE 1.2 03/01/2022    CALCIUM 9.6 03/01/2022    PHOS 2.7 03/01/2022         Significant Diagnostic Studies    Radiology:   CTA HEAD NECK W CONTRAST   Final Result      1. No stenosis. PROCEDURE: CT ANGIOGRAPHY HEAD WITH/WITHOUT CONTRAST      INDICATION: tia      COMPARISON: none      TECHNIQUE: Axial CT imaging obtained through the head prior to and following administration of IV contrast. Axial images, multiplanar reformatted images, and maximum intensity projection images were reviewed for CT angiographic technique. IV contrast: 80 mL Isovue 370. FINDINGS:      ANTERIOR CIRCULATION: The intracranial internal carotid arteries, anterior cerebral arteries, and middle cerebral arteries demonstrate no occlusion or stenosis. No evidence for aneurysm or arteriovenous malformation. POSTERIOR CIRCULATION: Mostly fetal origin of bilateral posterior cerebral arteries without stenosis. Small vertebral patent vertebral arteries and basilar artery. INTRACRANIAL VENOUS SYSTEM: No evidence for intracranial venous thrombosis. INTRACRANIAL HEMORRHAGE: None. VENTRICLES: Normal in size and configuration for age. BRAIN PARENCHYMA: Gray-white matter differentiation is normal. No intracranial mass effect. Slight left basal ganglia calcification. SKULL: No destructive osseous process or fracture. PARANASAL SINUSES / MASTOIDS: Mild mucoperiosteal thickening paranasal sinuses. ORBITS: Normal.      EXTRACRANIAL SOFT TISSUES: Normal.      OTHER: None. IMPRESSION:      1. No hemodynamically significant stenosis. CT HEAD WO CONTRAST   Final Result   Impression:   1. No evidence of recent intracranial hemorrhage. XR CHEST PORTABLE   Final Result   Impression:   1. No airspace disease.       MRI BRAIN WO CONTRAST    (Results Pending)          Consults:     IP CONSULT TO HOSPITALIST  IP CONSULT TO PHARMACY  IP CONSULT TO NEUROLOGY  IP CONSULT TO CASE MANAGEMENT  IP CONSULT TO SOCIAL WORK    Disposition:  LTC     Condition at Discharge: Stable    Discharge Instructions/Follow-up:  PCP    Code Status:  Full Code     Activity: activity as tolerated    Diet: regular diet      Discharge Medications:     Current Discharge Medication List           Details   buPROPion (WELLBUTRIN SR) 100 MG extended release tablet Take 100 mg by mouth 2 times daily      metoprolol tartrate (LOPRESSOR) 50 MG tablet Take 50 mg by mouth 2 times daily      NIFEdipine (PROCARDIA XL) 60 MG extended release tablet Take 60 mg by mouth daily      risperiDONE (RISPERDAL) 1 MG tablet Take 1.5 mg by mouth 2 times daily       traZODone (DESYREL) 50 MG tablet Take 25 mg by mouth nightly             Time Spent on discharge is more than 20 minutes in the examination, evaluation, counseling and review of medications and discharge plan. Signed:    Tracey Walker MD   3/2/2022      Thank you New Marmolejo MD for the opportunity to be involved in this patient's care.

## 2022-03-02 NOTE — PROGRESS NOTES
Physical Therapy    Facility/Department: [facility] 5T ORTHO/NEURO  Initial Assessment, treatment, and discharge    NAME: Monika Campbell  : 1965  MRN: [id number]    Date of Service: 3/2/2022    Discharge Recommendations:Iam Parks scored a 24/24 on the AM-PAC short mobility form. At this time, no further PT is recommended upon discharge due to the patient being at his baseline with mobility. Recommend patient returns to prior setting with prior services. PT Equipment Recommendations  Equipment Needed: No    Assessment   Assessment: The pt presents with TIA, imaging negative for CVA. He appears to have no mobility deficits, strength or balance deficits from his baseline. At this time he has no PT needs and therapy to discharge the pt from caseload. Treatment Diagnosis: impaired mobility 2/2 TIA  Prognosis: Good  Decision Making: Low Complexity  PT Education: Goals;PT Role;Plan of Care;General Safety;Transfer Training;Functional Mobility Training;Gait Training  Patient Education: pt verbalized understanding  REQUIRES PT FOLLOW UP: Yes  Activity Tolerance  Activity Tolerance: Patient Tolerated treatment well       Patient Diagnosis(es): The encounter diagnosis was TIA (transient ischemic attack). has a past medical history of Dementia ([facility] [address] Depression, Hypertension, PVD (peripheral vascular disease) with claudication ([facility] [address] Schizophrenia ([facility] [address] and Substance abuse ([facility] [address] has no past surgical history on file. Restrictions  Position Activity Restriction  Other position/activity restrictions: up as tolerated  Vision/Hearing  Vision: Within Functional Limits  Hearing: Within functional limits     Subjective  General  Chart Reviewed: Yes  Patient assessed for rehabilitation services?: Yes  Additional Pertinent Hx: 55 y.o. male who presents from his nursing home via EMS. According to nursing home report patient was last known normal was at 5:20 PM today.   His primary nurse has noticed that patient was slumping to his left side, left-sided weakness, aphasic with slurred speech and ataxic movements leading to unsteady gait. PMH dementia, schizophrenia. He has been a resident in a nursing home for 8 years. Family / Caregiver Present: No  Referring Practitioner: Ramond Dubin, MD  Diagnosis: TIA  Follows Commands: Within Functional Limits  General Comment  Comments: The pt presents supine in bed and willing to work with therapist.  Subjective  Subjective: 'A little bit of the symptoms remain; the choking a little when I eat. Otherwise no other symtpoms. 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 He showers and gets him dressed.   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 though was oriented and otherwise appropriate    Objective     Strength RLE  Strength RLE: WFL  Strength LLE  Strength LLE: WFL        Bed mobility  Supine to Sit: Independent (HOB elevated)  Scooting: Independent (to EOB)  Transfers  Sit to Stand: Supervision (from bed, from toilet)  Stand to sit: Supervision  Ambulation  Ambulation?: Yes  Ambulation 1  Surface: level tile  Device: No Device  Assistance: Supervision  Quality of Gait: normal den and gait characteristics  Distance: 15'+350'  Comments: no LOB  Stairs/Curb  Stairs?: No (pt does not take stairs at [facility])     Balance  Sitting - Static: Good  Sitting - Dynamic: Fair;+  Standing - Static: Fair;+  Standing - Dynamic: Fair;+  Comments: Pt independent to supervision with mobility without AD    Treatment:  Functional mobility training and pt education    Plan   Safety Devices  Type of devices: Call light within reach,Chair alarm in place,Left in chair,Nurse notified    AM-PAC Score  AM-PAC Inpatient Mobility Raw Score : 24 (03/02/22 1038)  AM-PAC Inpatient T-Scale Score : 61.14 (03/02/22 1038)  Mobility Inpatient CMS 0-100% Score: 0 (03/02/22 1038)  Mobility Inpatient CMS G-Code Modifier : [facility] (03/02/22 1038)    Therapy Time   Individual Concurrent Group Co-treatment   Time In 0950         Time Out 1017         Minutes 27         Timed Code Treatment Minutes: 12 Minutes    Timed Code Treatment Minutes:  12 Minutes    Total Treatment Minutes:  27 minutes      Ellis Vaughan, PT

## 2022-03-03 VITALS
DIASTOLIC BLOOD PRESSURE: 71 MMHG | WEIGHT: 245 LBS | RESPIRATION RATE: 16 BRPM | BODY MASS INDEX: 37.13 KG/M2 | HEART RATE: 96 BPM | OXYGEN SATURATION: 94 % | HEIGHT: 68 IN | TEMPERATURE: 98 F | SYSTOLIC BLOOD PRESSURE: 115 MMHG

## 2022-03-03 LAB
ESTIMATED AVERAGE GLUCOSE: 142.7 MG/DL
GLUCOSE BLD-MCNC: 127 MG/DL (ref 70–99)
GLUCOSE BLD-MCNC: 94 MG/DL (ref 70–99)
HBA1C MFR BLD: 6.6 %
PERFORMED ON: ABNORMAL
PERFORMED ON: NORMAL

## 2022-03-03 PROCEDURE — 6370000000 HC RX 637 (ALT 250 FOR IP): Performed by: INTERNAL MEDICINE

## 2022-03-03 PROCEDURE — G0378 HOSPITAL OBSERVATION PER HR: HCPCS

## 2022-03-03 RX ORDER — ASPIRIN 81 MG/1
81 TABLET ORAL DAILY
Qty: 30 TABLET | Refills: 3
Start: 2022-03-03

## 2022-03-03 RX ORDER — ATORVASTATIN CALCIUM 40 MG/1
40 TABLET, FILM COATED ORAL NIGHTLY
Qty: 30 TABLET | Refills: 3
Start: 2022-03-03

## 2022-03-03 RX ADMIN — ASPIRIN 81 MG: 81 TABLET, COATED ORAL at 09:16

## 2022-03-03 ASSESSMENT — PAIN SCALES - GENERAL
PAINLEVEL_OUTOF10: 0
PAINLEVEL_OUTOF10: 0

## 2022-03-03 NOTE — CARE COORDINATION
Patient did not d/c yesterday-needed MRI which patient has had. D/C ordered today and patient will transport to [facility] at 1300 with [address] Ambulance form on chart and orders were received yesterday.    Electronically signed by Suri Dubon RN on 3/3/2022 at 11:29 AM  [phone]

## 2022-03-03 NOTE — PROGRESS NOTES
Mr. Shay Jimenez being transported back to [facility]. Updated the NP over the phone about Mr. Shay Jimenez.

## 2022-03-03 NOTE — PLAN OF CARE
Problem: Skin Integrity:  Goal: Will show no infection signs and symptoms  Description: Will show no infection signs and symptoms  3/2/2022 2012 by Marilou Woods RN  Outcome: Ongoing  3/2/2022 1638 by Farrah Evangelista RN  Outcome: Ongoing  Goal: Absence of new skin breakdown  Description: Absence of new skin breakdown  3/2/2022 2012 by Marilou Woods RN  Outcome: Ongoing  3/2/2022 1638 by Farrah Evangelista RN  Outcome: Ongoing     Problem: Falls - Risk of:  Goal: Will remain free from falls  Description: Will remain free from falls  3/2/2022 2012 by Marilou Woods RN  Outcome: Ongoing  3/2/2022 1638 by Farrah Evangelista RN  Outcome: Ongoing  Goal: Absence of physical injury  Description: Absence of physical injury  3/2/2022 2012 by Marilou Woods RN  Outcome: Ongoing  3/2/2022 1638 by Farrah Evangelista RN  Outcome: Ongoing     Problem: HEMODYNAMIC STATUS  Goal: Patient has stable vital signs and fluid balance  3/2/2022 2012 by Marilou Woods RN  Outcome: Ongoing  3/2/2022 1638 by Farrah Evangelista RN  Outcome: Ongoing     Problem: ACTIVITY INTOLERANCE/IMPAIRED MOBILITY  Goal: Mobility/activity is maintained at optimum level for patient  Outcome: Ongoing     Problem: COMMUNICATION IMPAIRMENT  Goal: Ability to express needs and understand communication  Outcome: Ongoing